# Patient Record
Sex: FEMALE | Race: WHITE | NOT HISPANIC OR LATINO | URBAN - METROPOLITAN AREA
[De-identification: names, ages, dates, MRNs, and addresses within clinical notes are randomized per-mention and may not be internally consistent; named-entity substitution may affect disease eponyms.]

---

## 2017-01-20 ENCOUNTER — FOLLOW UP ESTABLISHED (OUTPATIENT)
Dept: URBAN - METROPOLITAN AREA CLINIC 30 | Facility: CLINIC | Age: 67
End: 2017-01-20
Payer: MEDICARE

## 2017-01-20 DIAGNOSIS — H00.021 HORDEOLUM INTERNUM (MEIBOMIAN GLAND DYSFUNCTION), RIGHT UPPER LID: ICD-10-CM

## 2017-01-20 DIAGNOSIS — H16.223 KERATOCONJUNCT SICCA, NOT SPECIFIED AS SJOGREN'S, BILATERAL: Primary | ICD-10-CM

## 2017-01-20 DIAGNOSIS — H00.025 HORDEOLUM INTERNUM (MEIBOMIAN GLAND DYSFUNCTION), LEFT LOWER LID: ICD-10-CM

## 2017-01-20 PROCEDURE — 92012 INTRM OPH EXAM EST PATIENT: CPT | Performed by: OPTOMETRIST

## 2017-01-20 RX ORDER — CYCLOSPORINE 0.5 MG/ML
0.05 % EMULSION OPHTHALMIC
Qty: 180 | Refills: 3 | Status: INACTIVE
Start: 2017-01-20 | End: 2017-04-07

## 2017-01-20 RX ORDER — ERYTHROMYCIN 5 MG/G
OINTMENT OPHTHALMIC
Qty: 1 | Refills: 1 | Status: INACTIVE
Start: 2017-01-20 | End: 2017-04-07

## 2017-01-20 RX ORDER — CYCLOSPORINE 0.5 MG/ML
0.05 % EMULSION OPHTHALMIC
Qty: 60 | Refills: 0 | Status: INACTIVE
Start: 2017-01-20 | End: 2017-01-20

## 2017-01-20 ASSESSMENT — INTRAOCULAR PRESSURE
OS: 13
OD: 13

## 2017-04-07 ENCOUNTER — FOLLOW UP ESTABLISHED (OUTPATIENT)
Dept: URBAN - METROPOLITAN AREA CLINIC 30 | Facility: CLINIC | Age: 67
End: 2017-04-07
Payer: MEDICARE

## 2017-04-07 DIAGNOSIS — H00.024 HORDEOLUM INTERNUM (MEIBOMIAN GLAND DYSFUNCTION), LEFT UPPER LID: ICD-10-CM

## 2017-04-07 PROCEDURE — 92012 INTRM OPH EXAM EST PATIENT: CPT | Performed by: OPTOMETRIST

## 2017-04-07 RX ORDER — PREDNISOLONE ACETATE 10 MG/ML
1 % SUSPENSION/ DROPS OPHTHALMIC
Qty: 1 | Refills: 0 | Status: INACTIVE
Start: 2017-04-07 | End: 2017-05-05

## 2017-04-07 ASSESSMENT — INTRAOCULAR PRESSURE
OD: 20
OS: 19

## 2017-05-05 ENCOUNTER — FOLLOW UP ESTABLISHED (OUTPATIENT)
Dept: URBAN - METROPOLITAN AREA CLINIC 30 | Facility: CLINIC | Age: 67
End: 2017-05-05
Payer: MEDICARE

## 2017-05-05 DIAGNOSIS — H00.022 HORDEOLUM INTERNUM (MEIBOMIAN GLAND DYSFUNCTION), RIGHT LOWER LID: ICD-10-CM

## 2017-05-05 PROCEDURE — 92012 INTRM OPH EXAM EST PATIENT: CPT | Performed by: OPTOMETRIST

## 2017-05-05 PROCEDURE — 83861 MICROFLUID ANALY TEARS: CPT | Performed by: OPTOMETRIST

## 2017-05-05 RX ORDER — OLOPATADINE HYDROCHLORIDE 7 MG/ML
0.7 % SOLUTION OPHTHALMIC
Qty: 1 | Refills: 3 | Status: INACTIVE
Start: 2017-05-05 | End: 2017-06-16

## 2017-05-05 RX ORDER — LOTEPREDNOL ETABONATE 2 MG/ML
0.2 % SUSPENSION/ DROPS OPHTHALMIC
Qty: 1 | Refills: 0 | Status: INACTIVE
Start: 2017-05-05 | End: 2017-06-16

## 2017-05-05 ASSESSMENT — INTRAOCULAR PRESSURE
OD: 17
OS: 16

## 2017-06-16 ENCOUNTER — FOLLOW UP ESTABLISHED (OUTPATIENT)
Dept: URBAN - METROPOLITAN AREA CLINIC 30 | Facility: CLINIC | Age: 67
End: 2017-06-16
Payer: MEDICARE

## 2017-06-16 PROCEDURE — 92012 INTRM OPH EXAM EST PATIENT: CPT | Performed by: OPTOMETRIST

## 2017-06-16 RX ORDER — CYCLOSPORINE 0.5 MG/ML
0.05 % EMULSION OPHTHALMIC
Qty: 60 | Refills: 0 | Status: INACTIVE
Start: 2017-06-16 | End: 2017-07-28

## 2017-06-16 RX ORDER — FLUOROMETHOLONE 1 MG/ML
0.1 % SUSPENSION/ DROPS OPHTHALMIC
Qty: 1 | Refills: 0 | Status: INACTIVE
Start: 2017-06-16 | End: 2017-07-28

## 2017-06-16 ASSESSMENT — INTRAOCULAR PRESSURE
OD: 20
OS: 22

## 2017-07-28 ENCOUNTER — FOLLOW UP ESTABLISHED (OUTPATIENT)
Dept: URBAN - METROPOLITAN AREA CLINIC 30 | Facility: CLINIC | Age: 67
End: 2017-07-28
Payer: MEDICARE

## 2017-07-28 DIAGNOSIS — H04.123 TEAR FILM INSUFFICIENCY OF BILATERAL LACRIMAL GLANDS: ICD-10-CM

## 2017-07-28 DIAGNOSIS — H10.45 OTHER CHRONIC ALLERGIC CONJUNCTIVITIS: Primary | ICD-10-CM

## 2017-07-28 PROCEDURE — 92012 INTRM OPH EXAM EST PATIENT: CPT | Performed by: OPTOMETRIST

## 2017-07-28 RX ORDER — FLUOROMETHOLONE 1 MG/ML
0.1 % SUSPENSION/ DROPS OPHTHALMIC
Qty: 1 | Refills: 0 | Status: INACTIVE
Start: 2017-07-28 | End: 2017-08-30

## 2017-07-28 ASSESSMENT — INTRAOCULAR PRESSURE
OS: 16
OD: 17

## 2017-09-22 ENCOUNTER — FOLLOW UP ESTABLISHED (OUTPATIENT)
Dept: URBAN - METROPOLITAN AREA CLINIC 30 | Facility: CLINIC | Age: 67
End: 2017-09-22
Payer: MEDICARE

## 2017-09-22 PROCEDURE — 92012 INTRM OPH EXAM EST PATIENT: CPT | Performed by: OPTOMETRIST

## 2017-09-22 RX ORDER — FLUOROMETHOLONE 1 MG/ML
0.1 % SUSPENSION/ DROPS OPHTHALMIC
Qty: 1 | Refills: 1 | Status: INACTIVE
Start: 2017-09-22 | End: 2017-11-21

## 2017-09-22 RX ORDER — ERYTHROMYCIN 5 MG/G
OINTMENT OPHTHALMIC
Qty: 1 | Refills: 3 | Status: INACTIVE
Start: 2017-09-22 | End: 2018-02-07

## 2017-09-22 ASSESSMENT — INTRAOCULAR PRESSURE
OS: 18
OD: 18

## 2017-11-17 ENCOUNTER — FOLLOW UP ESTABLISHED (OUTPATIENT)
Dept: URBAN - METROPOLITAN AREA CLINIC 30 | Facility: CLINIC | Age: 67
End: 2017-11-17
Payer: MEDICARE

## 2017-11-17 PROCEDURE — 92014 COMPRE OPH EXAM EST PT 1/>: CPT | Performed by: OPTOMETRIST

## 2017-11-17 PROCEDURE — 92134 CPTRZ OPH DX IMG PST SGM RTA: CPT | Performed by: OPTOMETRIST

## 2017-11-17 PROCEDURE — 92132 CPTRZD OPH DX IMG ANT SGM: CPT | Performed by: OPTOMETRIST

## 2017-11-17 ASSESSMENT — INTRAOCULAR PRESSURE
OD: 17
OS: 15

## 2017-11-17 ASSESSMENT — VISUAL ACUITY
OD: 20/30
OS: 20/60

## 2017-11-17 ASSESSMENT — KERATOMETRY
OS: 46.81
OD: 46.43

## 2018-01-23 ENCOUNTER — Encounter (OUTPATIENT)
Dept: URBAN - METROPOLITAN AREA CLINIC 30 | Facility: CLINIC | Age: 68
End: 2018-01-23
Payer: MEDICARE

## 2018-01-23 DIAGNOSIS — H25.813 COMBINED FORMS OF AGE-RELATED CATARACT, BILATERAL: Primary | ICD-10-CM

## 2018-01-23 DIAGNOSIS — Z01.818 ENCOUNTER FOR OTHER PREPROCEDURAL EXAMINATION: Primary | ICD-10-CM

## 2018-01-23 PROCEDURE — 99213 OFFICE O/P EST LOW 20 MIN: CPT | Performed by: NURSE PRACTITIONER

## 2018-01-23 PROCEDURE — 92025 CPTRIZED CORNEAL TOPOGRAPHY: CPT | Performed by: OPHTHALMOLOGY

## 2018-01-23 RX ORDER — ATENOLOL 25 MG/1
25 MG TABLET ORAL
Qty: 0 | Refills: 0 | Status: INACTIVE
Start: 2018-01-23 | End: 2018-08-01

## 2018-01-23 RX ORDER — LOVASTATIN 20 MG/1
20 MG TABLET ORAL
Qty: 0 | Refills: 0 | Status: INACTIVE
Start: 2018-01-23 | End: 2018-08-01

## 2018-01-23 RX ORDER — LISINOPRIL 20 MG/1
20 MG TABLET ORAL
Qty: 0 | Refills: 0 | Status: INACTIVE
Start: 2018-01-23 | End: 2018-08-01

## 2018-01-24 ENCOUNTER — FOLLOW UP ESTABLISHED (OUTPATIENT)
Dept: URBAN - METROPOLITAN AREA CLINIC 24 | Facility: CLINIC | Age: 68
End: 2018-01-24
Payer: MEDICARE

## 2018-01-24 DIAGNOSIS — H53.002 UNSPECIFIED AMBLYOPIA, LEFT EYE: ICD-10-CM

## 2018-01-24 DIAGNOSIS — H25.12 AGE-RELATED NUCLEAR CATARACT, LEFT EYE: Primary | ICD-10-CM

## 2018-01-24 DIAGNOSIS — H25.11 AGE-RELATED NUCLEAR CATARACT, RIGHT EYE: ICD-10-CM

## 2018-01-24 DIAGNOSIS — H40.033 ANATOMICAL NARROW ANGLE, BILATERAL: ICD-10-CM

## 2018-01-24 PROCEDURE — 92014 COMPRE OPH EXAM EST PT 1/>: CPT | Performed by: OPHTHALMOLOGY

## 2018-01-24 ASSESSMENT — INTRAOCULAR PRESSURE
OS: 16
OD: 15

## 2018-01-31 ENCOUNTER — SURGERY (OUTPATIENT)
Dept: URBAN - METROPOLITAN AREA SURGERY 12 | Facility: SURGERY | Age: 68
End: 2018-01-31
Payer: MEDICARE

## 2018-01-31 PROCEDURE — 66984 XCAPSL CTRC RMVL W/O ECP: CPT | Performed by: OPHTHALMOLOGY

## 2018-02-01 ENCOUNTER — POST OP (OUTPATIENT)
Dept: URBAN - METROPOLITAN AREA CLINIC 30 | Facility: CLINIC | Age: 68
End: 2018-02-01

## 2018-02-01 PROCEDURE — 99024 POSTOP FOLLOW-UP VISIT: CPT | Performed by: OPTOMETRIST

## 2018-02-01 ASSESSMENT — INTRAOCULAR PRESSURE: OS: 14

## 2018-02-07 ENCOUNTER — POST OP (OUTPATIENT)
Dept: URBAN - METROPOLITAN AREA CLINIC 30 | Facility: CLINIC | Age: 68
End: 2018-02-07
Payer: MEDICARE

## 2018-02-07 DIAGNOSIS — H25.13 AGE-RELATED NUCLEAR CATARACT, BILATERAL: ICD-10-CM

## 2018-02-07 PROCEDURE — 92134 CPTRZ OPH DX IMG PST SGM RTA: CPT | Performed by: OPTOMETRIST

## 2018-02-07 PROCEDURE — 99024 POSTOP FOLLOW-UP VISIT: CPT | Performed by: OPTOMETRIST

## 2018-02-07 ASSESSMENT — INTRAOCULAR PRESSURE
OD: 16
OS: 14

## 2018-02-07 ASSESSMENT — VISUAL ACUITY
OD: 20/30
OS: 20/60

## 2018-02-14 ENCOUNTER — SURGERY (OUTPATIENT)
Dept: URBAN - METROPOLITAN AREA SURGERY 12 | Facility: SURGERY | Age: 68
End: 2018-02-14
Payer: MEDICARE

## 2018-02-14 PROCEDURE — 66984 XCAPSL CTRC RMVL W/O ECP: CPT | Performed by: OPHTHALMOLOGY

## 2018-02-15 ENCOUNTER — POST OP (OUTPATIENT)
Dept: URBAN - METROPOLITAN AREA CLINIC 30 | Facility: CLINIC | Age: 68
End: 2018-02-15

## 2018-02-15 DIAGNOSIS — Z96.1 PRESENCE OF INTRAOCULAR LENS: Primary | ICD-10-CM

## 2018-02-15 PROCEDURE — 99024 POSTOP FOLLOW-UP VISIT: CPT | Performed by: OPTOMETRIST

## 2018-02-15 ASSESSMENT — INTRAOCULAR PRESSURE: OD: 16

## 2018-03-09 ENCOUNTER — POST OP (OUTPATIENT)
Dept: URBAN - METROPOLITAN AREA CLINIC 30 | Facility: CLINIC | Age: 68
End: 2018-03-09
Payer: MEDICARE

## 2018-03-09 DIAGNOSIS — H02.055 TRICHIASIS WITHOUT ENTROPIAN LEFT LOWER EYELID: ICD-10-CM

## 2018-03-09 PROCEDURE — 67820 REVISE EYELASHES: CPT | Performed by: OPTOMETRIST

## 2018-03-09 PROCEDURE — 99024 POSTOP FOLLOW-UP VISIT: CPT | Performed by: OPTOMETRIST

## 2018-03-09 PROCEDURE — 92015 DETERMINE REFRACTIVE STATE: CPT | Performed by: OPTOMETRIST

## 2018-03-09 ASSESSMENT — VISUAL ACUITY
OS: 20/30
OD: 20/20

## 2018-03-09 ASSESSMENT — INTRAOCULAR PRESSURE
OS: 14
OD: 15

## 2018-03-30 ENCOUNTER — POST OP (OUTPATIENT)
Dept: URBAN - METROPOLITAN AREA CLINIC 30 | Facility: CLINIC | Age: 68
End: 2018-03-30
Payer: MEDICARE

## 2018-03-30 PROCEDURE — 99024 POSTOP FOLLOW-UP VISIT: CPT | Performed by: OPTOMETRIST

## 2018-03-30 RX ORDER — OLOPATADINE HYDROCHLORIDE 7 MG/ML
0.7 % SOLUTION OPHTHALMIC
Qty: 1 | Refills: 6 | Status: INACTIVE
Start: 2018-03-30 | End: 2018-08-01

## 2018-03-30 RX ORDER — PREDNISOLONE ACETATE 10 MG/ML
1 % SUSPENSION/ DROPS OPHTHALMIC
Qty: 15 | Refills: 0 | Status: INACTIVE
Start: 2018-03-30 | End: 2018-08-01

## 2018-03-30 ASSESSMENT — INTRAOCULAR PRESSURE
OS: 16
OD: 18

## 2018-08-01 ENCOUNTER — FOLLOW UP ESTABLISHED (OUTPATIENT)
Dept: URBAN - METROPOLITAN AREA CLINIC 30 | Facility: CLINIC | Age: 68
End: 2018-08-01
Payer: MEDICARE

## 2018-08-01 PROCEDURE — 99213 OFFICE O/P EST LOW 20 MIN: CPT | Performed by: OPTOMETRIST

## 2018-08-01 ASSESSMENT — INTRAOCULAR PRESSURE
OS: 14
OD: 13

## 2018-08-01 ASSESSMENT — VISUAL ACUITY
OS: 20/50
OD: 20/25

## 2020-05-12 ENCOUNTER — FOLLOW UP ESTABLISHED (OUTPATIENT)
Dept: URBAN - METROPOLITAN AREA CLINIC 30 | Facility: CLINIC | Age: 70
End: 2020-05-12
Payer: MEDICARE

## 2020-05-12 PROCEDURE — 92014 COMPRE OPH EXAM EST PT 1/>: CPT | Performed by: OPTOMETRIST

## 2020-05-12 PROCEDURE — 92134 CPTRZ OPH DX IMG PST SGM RTA: CPT | Performed by: OPTOMETRIST

## 2020-05-12 ASSESSMENT — VISUAL ACUITY
OD: 20/25
OS: 20/70

## 2020-05-12 ASSESSMENT — INTRAOCULAR PRESSURE
OS: 18
OD: 18

## 2020-05-12 ASSESSMENT — KERATOMETRY
OD: 46.30
OS: 46.69

## 2022-04-27 ENCOUNTER — OFFICE VISIT (OUTPATIENT)
Dept: URBAN - METROPOLITAN AREA CLINIC 30 | Facility: CLINIC | Age: 72
End: 2022-04-27
Payer: MEDICARE

## 2022-04-27 DIAGNOSIS — H04.123 DRY EYE SYNDROME OF BILATERAL LACRIMAL GLANDS: Primary | ICD-10-CM

## 2022-04-27 DIAGNOSIS — H40.033 ANATOMICAL NARROW ANGLE, BILATERAL: ICD-10-CM

## 2022-04-27 DIAGNOSIS — Z96.1 PRESENCE OF INTRAOCULAR LENS: ICD-10-CM

## 2022-04-27 DIAGNOSIS — H53.002 UNSPECIFIED AMBLYOPIA, LEFT EYE: ICD-10-CM

## 2022-04-27 PROCEDURE — 92134 CPTRZ OPH DX IMG PST SGM RTA: CPT | Performed by: OPTOMETRIST

## 2022-04-27 PROCEDURE — 99213 OFFICE O/P EST LOW 20 MIN: CPT | Performed by: OPTOMETRIST

## 2022-04-27 ASSESSMENT — INTRAOCULAR PRESSURE
OD: 15
OS: 16

## 2022-04-27 ASSESSMENT — VISUAL ACUITY
OS: 20/50
OD: 20/25

## 2022-04-27 ASSESSMENT — KERATOMETRY
OD: 46.37
OS: 46.69

## 2022-04-27 NOTE — IMPRESSION/PLAN
Impression: Tear film insufficiency of bilateral lacrimal glands Some PAC OU- some itchiness. Schirmers 10,15. Plan: Pt may do a dry eye trial elsewhere. Hx[[Significantly improved since using locally produced honey. Cont AT's qid OU. O3's. Avoid ceiling fans-wears goggles w/tv. Consider plugs despite Schirmer's. Tearing in the mornings improved lucy with FML. Didn't noticed any differece on Restasis after 3 months of use. ]]

## 2022-04-27 NOTE — IMPRESSION/PLAN
Impression: Amblyopia, left eye Plan: Since childhood per patient. Discussed with patient, understands this may limit vision after surgery. Monitor yearly.

## 2023-03-27 ENCOUNTER — OFFICE VISIT (OUTPATIENT)
Dept: URBAN - METROPOLITAN AREA CLINIC 30 | Facility: CLINIC | Age: 73
End: 2023-03-27
Payer: MEDICARE

## 2023-03-27 DIAGNOSIS — Z96.1 PRESENCE OF INTRAOCULAR LENS: ICD-10-CM

## 2023-03-27 DIAGNOSIS — H40.033 ANATOMICAL NARROW ANGLE, BILATERAL: Primary | ICD-10-CM

## 2023-03-27 DIAGNOSIS — H04.123 DRY EYE SYNDROME OF BILATERAL LACRIMAL GLANDS: ICD-10-CM

## 2023-03-27 PROCEDURE — 92014 COMPRE OPH EXAM EST PT 1/>: CPT

## 2023-03-27 PROCEDURE — 92133 CPTRZD OPH DX IMG PST SGM ON: CPT

## 2023-03-27 ASSESSMENT — INTRAOCULAR PRESSURE
OD: 17
OS: 17

## 2023-03-27 ASSESSMENT — VISUAL ACUITY
OS: 20/30
OD: 20/25

## 2023-03-27 ASSESSMENT — KERATOMETRY
OS: 44.13
OD: 46.13

## 2023-03-27 NOTE — IMPRESSION/PLAN
Impression: Tear film insufficiency of bilateral lacrimal glands Some PAC OU- some itchiness. Schirmers 10,15. Plan: Pt in a dry eye trial currently.

## 2023-03-27 NOTE — IMPRESSION/PLAN
Impression: Anatomical narrow angle, bilateral Plan: Patent LPIs OU, monitor. Pt concerned that pressure was 20 few weeks ago at dry eye clinic visit. Reassure pt. Continue to monitor.

## 2025-01-27 ENCOUNTER — OFFICE VISIT (OUTPATIENT)
Dept: URBAN - METROPOLITAN AREA CLINIC 30 | Facility: CLINIC | Age: 75
End: 2025-01-27
Payer: MEDICARE

## 2025-01-27 DIAGNOSIS — H04.123 DRY EYE SYNDROME OF BILATERAL LACRIMAL GLANDS: Primary | ICD-10-CM

## 2025-01-27 DIAGNOSIS — H40.033 ANATOMICAL NARROW ANGLE, BILATERAL: ICD-10-CM

## 2025-01-27 DIAGNOSIS — H26.492 OTHER SECONDARY CATARACT, LEFT EYE: ICD-10-CM

## 2025-01-27 DIAGNOSIS — H53.002 UNSPECIFIED AMBLYOPIA, LEFT EYE: ICD-10-CM

## 2025-01-27 DIAGNOSIS — H43.813 VITREOUS DEGENERATION, BILATERAL: ICD-10-CM

## 2025-01-27 DIAGNOSIS — Z96.1 PRESENCE OF INTRAOCULAR LENS: ICD-10-CM

## 2025-01-27 PROCEDURE — 92134 CPTRZ OPH DX IMG PST SGM RTA: CPT | Performed by: OPTOMETRIST

## 2025-01-27 PROCEDURE — 99214 OFFICE O/P EST MOD 30 MIN: CPT | Performed by: OPTOMETRIST

## 2025-01-27 ASSESSMENT — INTRAOCULAR PRESSURE
OS: 16
OD: 16

## 2025-01-27 ASSESSMENT — VISUAL ACUITY
OS: 20/30
OD: 20/20

## 2025-01-27 ASSESSMENT — KERATOMETRY
OD: 45.75
OS: 46.75

## 2025-03-19 ENCOUNTER — SURGERY (OUTPATIENT)
Facility: LOCATION | Age: 75
End: 2025-03-19
Payer: MEDICARE

## 2025-03-19 PROCEDURE — 66821 AFTER CATARACT LASER SURGERY: CPT | Performed by: OPHTHALMOLOGY

## 2025-05-15 ENCOUNTER — OFFICE VISIT (OUTPATIENT)
Dept: URBAN - METROPOLITAN AREA CLINIC 30 | Facility: CLINIC | Age: 75
End: 2025-05-15
Payer: MEDICARE

## 2025-05-15 DIAGNOSIS — H04.123 DRY EYE SYNDROME OF BILATERAL LACRIMAL GLANDS: Primary | ICD-10-CM

## 2025-05-15 DIAGNOSIS — H02.889 MEIBOMIAN GLAND DYSFUNCTION OF EYE: ICD-10-CM

## 2025-05-15 PROCEDURE — 83861 MICROFLUID ANALY TEARS: CPT | Performed by: OPTOMETRIST

## 2025-05-15 PROCEDURE — 99212 OFFICE O/P EST SF 10 MIN: CPT | Performed by: OPTOMETRIST

## 2025-05-15 ASSESSMENT — INTRAOCULAR PRESSURE
OD: 16
OS: 16